# Patient Record
Sex: FEMALE | Race: OTHER | NOT HISPANIC OR LATINO | URBAN - METROPOLITAN AREA
[De-identification: names, ages, dates, MRNs, and addresses within clinical notes are randomized per-mention and may not be internally consistent; named-entity substitution may affect disease eponyms.]

---

## 2022-02-12 ENCOUNTER — EMERGENCY (EMERGENCY)
Facility: HOSPITAL | Age: 22
LOS: 1 days | Discharge: HOME | End: 2022-02-12
Attending: EMERGENCY MEDICINE | Admitting: EMERGENCY MEDICINE
Payer: COMMERCIAL

## 2022-02-12 VITALS
OXYGEN SATURATION: 98 % | RESPIRATION RATE: 16 BRPM | WEIGHT: 117.95 LBS | SYSTOLIC BLOOD PRESSURE: 112 MMHG | TEMPERATURE: 97 F | DIASTOLIC BLOOD PRESSURE: 79 MMHG | HEIGHT: 67 IN | HEART RATE: 81 BPM

## 2022-02-12 PROCEDURE — 70450 CT HEAD/BRAIN W/O DYE: CPT | Mod: MA

## 2022-02-12 PROCEDURE — 70450 CT HEAD/BRAIN W/O DYE: CPT | Mod: 26,MA

## 2022-02-12 PROCEDURE — 99284 EMERGENCY DEPT VISIT MOD MDM: CPT | Mod: 25

## 2022-02-12 PROCEDURE — 99284 EMERGENCY DEPT VISIT MOD MDM: CPT

## 2022-02-12 RX ORDER — ACETAMINOPHEN 500 MG
650 TABLET ORAL ONCE
Refills: 0 | Status: COMPLETED | OUTPATIENT
Start: 2022-02-12 | End: 2022-02-12

## 2022-02-12 RX ADMIN — Medication 650 MILLIGRAM(S): at 12:34
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
## 2022-02-12 NOTE — ED PROVIDER NOTE - ATTENDING CONTRIBUTION TO CARE
Dr. Taylor: I performed a face to face bedside interview with patient regarding history of present illness, review of symptoms and past medical history. I completed an independent physical exam.  I have discussed patient's plan of care with PA.   I agree with note as stated above, having amended the EMR as needed to reflect my findings.   This includes HISTORY OF PRESENT ILLNESS, HIV, PAST MEDICAL/SURGICAL/FAMILY/SOCIAL HISTORY, ALLERGIES AND HOME MEDICATIONS, REVIEW OF SYSTEMS, PHYSICAL EXAM, and any PROGRESS NOTES during the time I functioned as the attending physician for this patient.    see mdm
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
## 2022-02-12 NOTE — ED PROVIDER NOTE - PATIENT PORTAL LINK FT
You can access the FollowMyHealth Patient Portal offered by [facility] by registering at the following website: [email or web address]. By joining [employer]’s FollowMyHealth portal, you will also be able to view your health information using other applications (apps) compatible with our system.
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
## 2022-06-01 NOTE — ED ADULT NURSE NOTE - DATE OF FIRST COVID-19 BOOSTER
15-Tim-2022 Cheek-To-Nose Interpolation Flap Text: A decision was made to reconstruct the defect utilizing an interpolation axial flap and a staged reconstruction.  A telfa template was made of the defect.  This telfa template was then used to outline the Cheek-To-Nose Interpolation flap.  The donor area for the pedicle flap was then injected with anesthesia.  The flap was excised through the skin and subcutaneous tissue down to the layer of the underlying musculature.  The interpolation flap was carefully excised within this deep plane to maintain its blood supply.  The edges of the donor site were undermined.   The donor site was closed in a primary fashion.  The pedicle was then rotated into position and sutured.  Once the tube was sutured into place, adequate blood supply was confirmed with blanching and refill.  The pedicle was then wrapped with xeroform gauze and dressed appropriately with a telfa and gauze bandage to ensure continued blood supply and protect the attached pedicle.

## 2022-12-22 NOTE — ED PROVIDER NOTE - CROS ED CONS ALL NEG
From: Tobias Cohu  To: Remy Lemos negative...

## 2023-08-31 NOTE — ED PROVIDER NOTE - CPE EDP ENMT NORM
----- Message from Keely Nicholas sent at 8/31/2023  3:12 PM CDT -----  Contact: [phone]  Requesting an RX refill or new RX.  Is this a refill or new RX:   RX name and strength ()losartan (COZAAR) 50 MG tablet:    Is this a 30 day or 90 day RX:   Pharmacy name and phone # (  [id number] - [place], [place] - [address]  [facility] [address]  [facility] [address]  Phone: [phone] Fax: [phone]       Patient state He is out of his Rx and the pharmacy haven't received it.      normal...

## 2024-04-09 ENCOUNTER — EMERGENCY (EMERGENCY)
Facility: HOSPITAL | Age: 24
LOS: 1 days | Discharge: HOME | End: 2024-04-09
Attending: EMERGENCY MEDICINE | Admitting: EMERGENCY MEDICINE
Payer: COMMERCIAL

## 2024-04-09 VITALS
SYSTOLIC BLOOD PRESSURE: 123 MMHG | HEART RATE: 108 BPM | OXYGEN SATURATION: 98 % | TEMPERATURE: 98 F | WEIGHT: 123.02 LBS | RESPIRATION RATE: 19 BRPM | HEIGHT: 68 IN | DIASTOLIC BLOOD PRESSURE: 81 MMHG

## 2024-04-09 PROBLEM — Z78.9 OTHER SPECIFIED HEALTH STATUS: Chronic | Status: ACTIVE | Noted: 2022-02-12

## 2024-04-09 PROCEDURE — 99284 EMERGENCY DEPT VISIT MOD MDM: CPT

## 2024-04-09 PROCEDURE — 73562 X-RAY EXAM OF KNEE 3: CPT | Mod: 26,LT

## 2024-04-09 PROCEDURE — 81025 URINE PREGNANCY TEST: CPT

## 2024-04-09 PROCEDURE — 73562 X-RAY EXAM OF KNEE 3: CPT

## 2024-04-09 PROCEDURE — 73562 X-RAY EXAM OF KNEE 3: CPT | Mod: 26,RT,77

## 2024-04-09 RX ORDER — IBUPROFEN 200 MG
600 TABLET ORAL ONCE
Refills: 0 | Status: COMPLETED | OUTPATIENT
Start: 2024-04-09 | End: 2024-04-09

## 2024-04-09 RX ADMIN — Medication 600 MILLIGRAM(S): at 15:07

## 2024-04-09 NOTE — ED ADULT TRIAGE NOTE - MEANS OF ARRIVAL
Reviewed discharge instructions with patient including prescription for Percocet. WIfe and friend here to take patient home. Pt wheeled to front door and transferred into back seat without difficulty. ambulatory

## 2024-04-09 NOTE — ED PROVIDER NOTE - PATIENT PORTAL LINK FT
You can access the FollowMyHealth Patient Portal offered by [facility] by registering at the following website: [email or web address]. By joining [employer]’s FollowMyHealth portal, you will also be able to view your health information using other applications (apps) compatible with our system.

## 2024-04-09 NOTE — ED ADULT NURSE NOTE - NSFALLUNIVINTERV_ED_ALL_ED
Bed/Stretcher in lowest position, wheels locked, appropriate side rails in place/Call bell, personal items and telephone in reach/Instruct patient to call for assistance before getting out of bed/chair/stretcher/Non-slip footwear applied when patient is off stretcher/[place] to call system/Physically safe environment - no spills, clutter or unnecessary equipment/Purposeful proactive rounding/Room/bathroom lighting operational, light cord in reach

## 2024-04-09 NOTE — ED ADULT TRIAGE NOTE - NSWEIGHTCALCTOOLDRUG_GEN_A_CORE
What Is The Reason For Today's Visit?: Full Body Skin Examination What Is The Reason For Today's Visit? (Being Monitored For X): concerning skin lesions on a periodic basis  used

## 2024-04-09 NOTE — ED PROVIDER NOTE - PHYSICAL EXAMINATION
General:     NAD, well-nourished, well-appearing  Head:     NC/AT, EOMI  Ext:   no deformities, b/l knees stable. Ambulatory without limp. Distal neurovasc intact.   Skin: no rash, old bruises to the b/l shins.  Neuro: AAOx3, no sensory/motor deficits

## 2024-05-31 ENCOUNTER — EMERGENCY (EMERGENCY)
Facility: HOSPITAL | Age: 24
LOS: 1 days | Discharge: HOME | End: 2024-05-31
Attending: EMERGENCY MEDICINE | Admitting: EMERGENCY MEDICINE
Payer: COMMERCIAL

## 2024-05-31 VITALS
HEIGHT: 68 IN | HEART RATE: 113 BPM | DIASTOLIC BLOOD PRESSURE: 96 MMHG | OXYGEN SATURATION: 97 % | TEMPERATURE: 98 F | RESPIRATION RATE: 18 BRPM | WEIGHT: 123.02 LBS | SYSTOLIC BLOOD PRESSURE: 154 MMHG

## 2024-05-31 VITALS
OXYGEN SATURATION: 98 % | HEART RATE: 88 BPM | RESPIRATION RATE: 18 BRPM | DIASTOLIC BLOOD PRESSURE: 83 MMHG | SYSTOLIC BLOOD PRESSURE: 125 MMHG

## 2024-05-31 PROCEDURE — 99282 EMERGENCY DEPT VISIT SF MDM: CPT

## 2024-05-31 PROCEDURE — 99283 EMERGENCY DEPT VISIT LOW MDM: CPT

## 2024-05-31 RX ORDER — DIPHENHYDRAMINE HCL 50 MG
25 CAPSULE ORAL ONCE
Refills: 0 | Status: COMPLETED | OUTPATIENT
Start: 2024-05-31 | End: 2024-05-31

## 2024-05-31 RX ADMIN — Medication 25 MILLIGRAM(S): at 19:53

## 2024-05-31 NOTE — ED PROVIDER NOTE - PATIENT PORTAL LINK FT
You can access the FollowMyHealth Patient Portal offered by [facility] by registering at the following website: [email or web address]. By joining [employer]’s FollowMyHealth portal, you will also be able to view your health information using other applications (apps) compatible with our system.

## 2024-05-31 NOTE — ED ADULT TRIAGE NOTE - HEIGHT IN CM
Medical Necessity Clause: This procedure was medically necessary because the lesions that were treated were: Spray Paint Technique: No Show Applicator Variable?: Yes Medical Necessity Information: It is in your best interest to select a reason for this procedure from the list below. All of these items fulfill various CMS LCD requirements except the new and changing color options. Duration Of Freeze Thaw-Cycle (Seconds): 5 Post-Care Instructions: I reviewed with the patient in detail post-care instructions. Patient is to wear sunprotection, and avoid picking at any of the treated lesions. Pt may apply Vaseline to crusted or scabbing areas. 172.72 Detail Level: Simple Number Of Freeze-Thaw Cycles: 1 freeze-thaw cycle Spray Paint Text: The liquid nitrogen was applied to the skin utilizing a spray paint frosting technique. Consent: The patient's consent was obtained including but not limited to risks of crusting, scabbing, blistering, scarring, darker or lighter pigmentary change, recurrence, incomplete removal and infection.

## 2024-05-31 NOTE — ED PROVIDER NOTE - PHYSICAL EXAMINATION
Vitals: I have reviewed the patients vital signs  General: nontoxic appearing  HEENT: Atraumatic, normocephalic, airway patent  Eyes: EOMI, tracking appropriately  Neck: no tracheal deviation  Chest/Lungs: no trauma, symmetric chest rise, speaking in complete sentences,  no resp distress  Heart: skin and extremities well perfused, regular rate and rhythm  Neuro: A+Ox3, appears non focal  MSK: no deformities  Skin: no cyanosis, no jaundice right buttocks with a confluent area of papules that are blanching.  There is no excoriations.  There is no surrounding erythema.  There is no crepitance of the skin.  Psych:  Normal mood and affect

## 2024-05-31 NOTE — ED PROVIDER NOTE - CLINICAL SUMMARY MEDICAL DECISION MAKING FREE TEXT BOX
23-year-old female no past medical history with a rash on her right buttocks.  Differential includes but is not limited to a contact dermatitis, bite of some kind, not consistent with that of a necrotizing fasciitis or abscess at this time.  Will treat with Benadryl to see if is any further improvement to the appearance of the skin.  Will reassess at that time.  At this point I think infectious is very unlikely

## 2024-05-31 NOTE — ED PROVIDER NOTE - OBJECTIVE STATEMENT
23-year-old female no significant past medical history presenting with 30 minutes of rash to her right buttocks.  States that she was sitting on the garage floor when she noticed an itchy rash form on her right buttocks.  She denies any allergies.  Has not had this happen before.  Boyfriend applied a cream and the itching did get better.  She did some googling and thought that she had scabies.  Does not recall being bit by anything.  There are no other symptoms at the current time

## 2024-05-31 NOTE — ED PROVIDER NOTE - PROGRESS NOTE DETAILS
Patient's decide to do not want to wait any longer for Benadryl as she wanted to leave.  There are no red flags on this rash at this time.  I gave her strict return instructions for what to look out for.  I will discharge her at this time.

## 2024-05-31 NOTE — ED ADULT NURSE NOTE - NSFALLUNIVINTERV_ED_ALL_ED
Bed/Stretcher in lowest position, wheels locked, appropriate side rails in place/Call bell, personal items and telephone in reach/Instruct patient to call for assistance before getting out of bed/chair/stretcher/Non-slip footwear applied when patient is off stretcher/[place] to call system/Physically safe environment - no spills, clutter or unnecessary equipment/Purposeful proactive rounding/Room/bathroom lighting operational, light cord in reach

## 2024-05-31 NOTE — ED ADULT NURSE NOTE - OBJECTIVE STATEMENT
Pt is alert, came to the ER due to rash on the right buttocks as of today. No fever oe pain. No medical problems.
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
## 2024-11-15 ENCOUNTER — EMERGENCY (EMERGENCY)
Facility: HOSPITAL | Age: 24
LOS: 1 days | Discharge: HOME | End: 2024-11-15
Attending: EMERGENCY MEDICINE | Admitting: INTERNAL MEDICINE
Payer: COMMERCIAL

## 2024-11-15 VITALS
SYSTOLIC BLOOD PRESSURE: 151 MMHG | WEIGHT: 123.02 LBS | HEIGHT: 68 IN | TEMPERATURE: 98 F | OXYGEN SATURATION: 98 % | HEART RATE: 84 BPM | RESPIRATION RATE: 18 BRPM | DIASTOLIC BLOOD PRESSURE: 94 MMHG

## 2024-11-15 PROCEDURE — 99284 EMERGENCY DEPT VISIT MOD MDM: CPT

## 2024-11-15 PROCEDURE — 99283 EMERGENCY DEPT VISIT LOW MDM: CPT

## 2024-11-15 PROCEDURE — 73620 X-RAY EXAM OF FOOT: CPT

## 2024-11-15 PROCEDURE — 73620 X-RAY EXAM OF FOOT: CPT | Mod: 26,RT
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
## 2024-11-15 NOTE — ED PROVIDER NOTE - NSFOLLOWUPCLINICS_GEN_ALL_ED_FT
[facility]  Orthopedic Surgery  [address] [place] [address]  Phone: [phone]  Fax:   Follow Up Time: 1-3 Days    [facility]  Orthopedic Surgery  [address]  [place] [address]  Phone: [phone]  Fax:     [facility] Orthopedic [facility]  Orthopedic Surgery  [address], 3rd & 4th [place] [address]  Phone: [phone]  Fax:     [facility] [place]  Orthopedics  .  [place]   Phone: [phone]  Fax:     [facility]  Orthopedic Surgery  [address]  [place] [address]  Phone: [phone]  Fax:     [facility]  Orthopedic Surgery  [address] [place] [address]  Phone: [phone]  Fax:     [facility]  Orthopedics  [address] [place] [address]  Phone: [phone]  Fax: [phone]    [facility]  Orthopedics  [address] [place] [address]  Phone: [phone]  Fax:     [facility]  Orthopedic Surgery  [address] [place] [address]  Phone: [phone]  Fax:

## 2024-11-15 NOTE — ED PROVIDER NOTE - PATIENT PORTAL LINK FT
You can access the FollowMyHealth Patient Portal offered by [facility] by registering at the following website: [email or web address]. By joining [employer]’s FollowMyHealth portal, you will also be able to view your health information using other applications (apps) compatible with our system.
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
## 2024-11-15 NOTE — ED PROVIDER NOTE - NSFOLLOWUPCLINICSTOKEN_GEN_ALL_ED_FT
398220:1-3 Days|| ||00\01||False;825213: || ||00\01||False;576517: || ||00\01||False;753256: || ||00\01||False;038860: || ||00\01||False;563689: || ||00\01||False;584868: || ||00\01||False;366987: || ||00\01||False;792172: || ||00\01||False;
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
## 2024-11-15 NOTE — ED ADULT NURSE NOTE - FINAL NURSING ELECTRONIC SIGNATURE
Quality 226: Preventive Care And Screening: Tobacco Use: Screening And Cessation Intervention: Patient screened for tobacco use and is an ex/non-smoker Quality 130: Documentation Of Current Medications In The Medical Record: Current Medications Documented Detail Level: Detailed Quality 110: Preventive Care And Screening: Influenza Immunization: Influenza Immunization not Administered because Patient Refused. 15-Nov-2024 16:07

## 2024-11-15 NOTE — ED ADULT TRIAGE NOTE - PAIN: PRESENCE, MLM
Ambulatory Care Coordination Note  5/5/2021  CM Risk Score: 8  Charlson 10 Year Mortality Risk Score: 4%     ACC: Marques Dickson RN    Summary Note: Second attempt to reach patients sister- Yong Uribe to discuss Care Coordination and my role, I left a message with the nature of my call and requested call back.
right foot/complains of pain/discomfort

## 2025-01-01 ENCOUNTER — EMERGENCY (EMERGENCY)
Facility: HOSPITAL | Age: 25
LOS: 1 days | Discharge: HOME | End: 2025-01-01
Attending: EMERGENCY MEDICINE | Admitting: EMERGENCY MEDICINE
Payer: COMMERCIAL

## 2025-01-01 VITALS
SYSTOLIC BLOOD PRESSURE: 113 MMHG | DIASTOLIC BLOOD PRESSURE: 81 MMHG | TEMPERATURE: 98 F | RESPIRATION RATE: 16 BRPM | OXYGEN SATURATION: 100 % | HEART RATE: 92 BPM

## 2025-01-01 VITALS
SYSTOLIC BLOOD PRESSURE: 117 MMHG | WEIGHT: 119.93 LBS | HEART RATE: 111 BPM | RESPIRATION RATE: 18 BRPM | TEMPERATURE: 97 F | DIASTOLIC BLOOD PRESSURE: 78 MMHG | HEIGHT: 68 IN | OXYGEN SATURATION: 99 %

## 2025-01-01 PROCEDURE — 73140 X-RAY EXAM OF FINGER(S): CPT

## 2025-01-01 PROCEDURE — 81025 URINE PREGNANCY TEST: CPT

## 2025-01-01 PROCEDURE — 99283 EMERGENCY DEPT VISIT LOW MDM: CPT

## 2025-01-01 PROCEDURE — 73140 X-RAY EXAM OF FINGER(S): CPT | Mod: 26,LT

## 2025-01-01 PROCEDURE — 26750 TREAT FINGER FRACTURE EACH: CPT | Mod: 54,LT

## 2025-01-01 PROCEDURE — 99284 EMERGENCY DEPT VISIT MOD MDM: CPT | Mod: 57

## 2025-01-01 RX ORDER — ACETAMINOPHEN 500MG 500 MG/1
975 TABLET, COATED ORAL ONCE
Refills: 0 | Status: COMPLETED | OUTPATIENT
Start: 2025-01-01 | End: 2025-01-01

## 2025-01-01 RX ADMIN — ACETAMINOPHEN 500MG 975 MILLIGRAM(S): 500 TABLET, COATED ORAL at 03:11

## 2025-01-01 NOTE — ED PROVIDER NOTE - PHYSICAL EXAMINATION
exam:   General: well appearing, NAD. mildly intoxicated but coherent  HEENT: eyes perrl, nose normal, head without swelling/tenderness/ discoloration or other signs of trauma  cor: RRR, s1s2, 2+rad pulses.   lungs: ctabl, no resp distress.   neuro: a&ox3, cn2-12 intact, MAYER, 5/5 strength c nl sensation all extremities, nl coordination.   MSK: no c/t/L spine tenderness. FROM hips without pain. Right fourth and fifth fingers with mild superficial abrasions to the dorsal side of mid phalanx.  Fully extends MCP PIP DIP.  Flexes PIP DIP fourth and fifth fingers to about 90 degrees, limited by pain.  Mild tenderness of the mid and distal phalanx of his fourth and fifth fingers.  No pallor or cyanosis of fingers
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
## 2025-01-01 NOTE — ED PROVIDER NOTE - CARE PROVIDERS DIRECT ADDRESSES
[facility]                      [facility], [address]                                PULMONARY FUNCTION    PATIENT NAME: Nishi Bhatt                  :        1928  MED REC NO:   [id number]                          ROOM:  ACCOUNT NO:   [de-identified]                          ADMIT DATE: 2017  PROVIDER:     Rekha Castellano MD    DATE OF PROCEDURE:  2017    PULMONARY FUNCTION STUDY    Spirometry reveals normal FVC and FEV1.  FEV1/FVC ratio is mildly reduced  at 68%. Lung volumes reveal normal total lung capacity, vital capacity,  and residual volume. Diffusion capacity is also mildly reduced. IMPRESSION:  Mild obstructive defect.         Mikel Mathew MD    D: 2017 8:41:14       T: 2017 9:37:47     [email or web address]  Job#: [id number]     Doc#: [id number]    CC:
[email or web address]

## 2025-01-01 NOTE — ED PROCEDURE NOTE - NS ED PROCEDURE ASSISTED BY
I reviewed the H&P, I examined the patient, and there are no changes in the patient's condition.   The procedure was performed independently

## 2025-01-01 NOTE — ED PROVIDER NOTE - CARE PROVIDER_API CALL
Gabriela Greene  Plastic Surgery  [address] [place] [address], [address]  [place] [address]  Phone: [phone]  Fax: [phone]  Follow Up Time: 4-6 Days
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
## 2025-01-01 NOTE — ED PROVIDER NOTE - PATIENT PORTAL LINK FT
You can access the FollowMyHealth Patient Portal offered by [facility] by registering at the following website: [email or web address]. By joining [employer]’s FollowMyHealth portal, you will also be able to view your health information using other applications (apps) compatible with our system.

## 2025-01-01 NOTE — ED PROVIDER NOTE - NSFOLLOWUPINSTRUCTIONS_ED_ALL_ED_FT
Follow-up with hand surgery Dr. Greene within a week regarding broken pinky.   -Keep left pinky in splint  -Take Motrin (ibuprofen) 400 to 600 mg every 6 hours as needed for pain.  -In addition to Motrin, you can also take Tylenol 1000 mg every 6 hours as needed for pain.    Finger Fracture    WHAT YOU NEED TO KNOW:    A finger fracture is a break in one or more of the bones in your finger.    DISCHARGE INSTRUCTIONS:    Return to the emergency department if:    Your cast or splint gets wet, damaged, or comes off.    Your splint or cast feels too tight.    You have severe pain.    Your injured finger is numb, cold, or pale.  Call your doctor or hand specialist if:    Your pain or swelling gets worse, even after treatment.    You have questions or concerns about your condition or care.  Medicines: You may need any of the following:    NSAIDs, such as ibuprofen, help decrease swelling, pain, and fever. This medicine is available with or without a doctor's order. NSAIDs can cause stomach bleeding or kidney problems in certain people. If you take blood thinner medicine, always ask your healthcare provider if NSAIDs are safe for you. Always read the medicine label and follow directions.    Acetaminophen decreases pain and fever. It is available without a doctor's order. Ask how much to take and how often to take it. Follow directions. Read the labels of all other medicines you are using to see if they also contain acetaminophen, or ask your doctor or pharmacist. Acetaminophen can cause liver damage if not taken correctly.    Prescription pain medicine may be given. Ask your healthcare provider how to take this medicine safely. Some prescription pain medicines contain acetaminophen. Do not take other medicines that contain acetaminophen without talking to your healthcare provider. Too much acetaminophen may cause liver damage. Prescription pain medicine may cause constipation. Ask your healthcare provider how to prevent or treat constipation.    Take your medicine as directed. Contact your healthcare provider if you think your medicine is not helping or if you have side effects. Tell your provider if you are allergic to any medicine. Keep a list of the medicines, vitamins, and herbs you take. Include the amounts, and when and why you take them. Bring the list or the pill bottles to follow-up visits. Carry your medicine list with you in case of an emergency.  Self-care:    Wear your splint as directed. Do not remove your splint until you follow up with your healthcare provider or hand specialist.    Apply ice on your finger for 15 to 20 minutes every hour or as directed. Use an ice pack, or put crushed ice in a plastic bag. Cover it with a towel before you apply it to your skin. Ice helps prevent tissue damage and decreases swelling and pain.    Elevate your finger above the level of your heart as often as you can. This will help decrease swelling and pain. Prop your hand on pillows or blankets to keep it elevated comfortably.        Go to physical therapy as directed. A physical therapist teaches you exercises to help improve movement and strength, and to decrease pain.  Follow up with your doctor or hand specialist within 2 days: Write down your questions so you remember to ask them during your visits.

## 2025-02-10 ENCOUNTER — EMERGENCY (EMERGENCY)
Facility: HOSPITAL | Age: 25
LOS: 1 days | Discharge: HOME | End: 2025-02-10
Attending: EMERGENCY MEDICINE | Admitting: EMERGENCY MEDICINE
Payer: COMMERCIAL

## 2025-02-10 VITALS
WEIGHT: 119.93 LBS | HEIGHT: 68 IN | HEART RATE: 85 BPM | RESPIRATION RATE: 17 BRPM | SYSTOLIC BLOOD PRESSURE: 130 MMHG | TEMPERATURE: 97 F | DIASTOLIC BLOOD PRESSURE: 89 MMHG | OXYGEN SATURATION: 99 %

## 2025-02-10 PROCEDURE — 73140 X-RAY EXAM OF FINGER(S): CPT | Mod: 26,LT

## 2025-02-10 PROCEDURE — 96372 THER/PROPH/DIAG INJ SC/IM: CPT

## 2025-02-10 PROCEDURE — 99285 EMERGENCY DEPT VISIT HI MDM: CPT

## 2025-02-10 PROCEDURE — 73140 X-RAY EXAM OF FINGER(S): CPT

## 2025-02-10 PROCEDURE — 99283 EMERGENCY DEPT VISIT LOW MDM: CPT | Mod: 25

## 2025-02-10 RX ORDER — ACETAMINOPHEN 160 MG/5ML
975 SUSPENSION ORAL ONCE
Refills: 0 | Status: COMPLETED | OUTPATIENT
Start: 2025-02-10 | End: 2025-02-10

## 2025-02-10 RX ORDER — KETOROLAC TROMETHAMINE 10 MG
30 TABLET ORAL ONCE
Refills: 0 | Status: DISCONTINUED | OUTPATIENT
Start: 2025-02-10 | End: 2025-02-10

## 2025-02-10 RX ADMIN — Medication 30 MILLIGRAM(S): at 12:57

## 2025-02-10 RX ADMIN — ACETAMINOPHEN 975 MILLIGRAM(S): 160 SUSPENSION ORAL at 12:56

## 2025-02-10 NOTE — ED PROVIDER NOTE - NSFOLLOWUPINSTRUCTIONS_ED_ALL_ED_FT
As discussed, please follow-up with hand surgery this week.  Phone number and address as below.  Keep the splint on until you see them.  For any pain, can take Tylenol and Motrin, follow directions on the bottle.    Gabriela Greene  Hand Surgery  [address], [address]  [place] [address]  Phone: [phone]  Fax: [phone]    Mallet Finger: Care Instructions  Skip Navigation  Overview  Mallet finger is a bent fingertip. It is caused by a fractured bone or torn tendon at the base of the finger joint near the fingertip. The injury can happen when a finger is bent with force, such as when trying to catch a ball and the fingertip is struck by the ball. It also is called baseball finger or drop finger.    Treatment includes wearing a splint for several weeks to keep the finger straight. Surgery may be needed if pieces of bone break off during the injury or if treatment with the splint does not work. Usually only a splint is needed.    It is very important that you wear and take care of the splint exactly as your doctor tells you to so that your finger heals properly and is no longer bent. Wearing a splint may interfere with your normal activities. Ask for help with daily tasks if you need it.    Follow-up care is a key part of your treatment and safety. Be sure to make and go to all appointments, and call your doctor if you are having problems. It's also a good idea to know your test results and keep a list of the medicines you take.    How can you care for yourself at home?  If your doctor put a splint on your finger, wear the splint exactly as directed. Do not remove it until your doctor says that you can.  Keep your hand raised above the level of your heart as much as you can. This will help reduce swelling.  Put ice or a cold pack on your finger for 10 to 20 minutes at a time. Try to do this every 1 to 2 hours for the next 3 days (when you are awake) or until the swelling goes down. Put a thin cloth between the ice and your skin. Keep the splint dry.  Take pain medicines exactly as directed.  If the doctor gave you a prescription medicine for pain, take it as prescribed.  If you are not taking a prescription pain medicine, ask your doctor if you can take an over-the-counter medicine.  When should you call for help?  	  Call your doctor now or seek immediate medical care if:    Your finger is cool or pale or changes color.  Your pain gets much worse.  You have tingling or numbness in your finger.  You have signs of infection. These include:  Increased pain, swelling, warmth, or redness.  Red streaks leading from the area.  Pus draining from the area.  Swollen lymph nodes in the armpits.  A fever.  Watch closely for changes in your health, and be sure to contact your doctor if:    You have more pain and swelling than your doctor told you to expect.

## 2025-02-10 NOTE — ED ADULT TRIAGE NOTE - CHIEF COMPLAINT QUOTE
Patient presents to ED with complaint of left habnd 5th digit pain. States "she broke her finger in december and that the finger has not healed properly yet." Alert and oriented x 4.

## 2025-02-10 NOTE — ED PROVIDER NOTE - PHYSICAL EXAMINATION
Vital Signs Last 24 Hrs  T(C): 36.3 (10 Feb 2025 12:00), Max: 36.3 (10 Feb 2025 12:00)  T(F): 97.3 (10 Feb 2025 12:00), Max: 97.3 (10 Feb 2025 12:00)  HR: 85 (10 Feb 2025 12:00) (85 - 85)  BP: 130/89 (10 Feb 2025 12:00) (130/89 - 130/89)  BP(mean): --  RR: 17 (10 Feb 2025 12:00) (17 - 17)  SpO2: 99% (10 Feb 2025 12:00) (99% - 99%)    Parameters below as of 10 Feb 2025 12:00  Patient On (Oxygen Delivery Method): room air    Vital Signs: I have reviewed the initial vital signs.  Constitutional: well-nourished, appears stated age, no acute distress.  MSK: left 5th digit in flexion. Only able to passively extend. Neck supple, nontender, nl ROM, no stepoff. Chest nontender. Back nontender in TLS spine or to b/l bony structures. Ext nontender, nl rom, no deformity.

## 2025-02-10 NOTE — ED PROVIDER NOTE - NS ED ATTENDING STATEMENT MOD
I have seen and examined this patient and fully participated in the care of this patient as the teaching attending.  The service was shared with the OMAR.  I reviewed and verified the documentation.

## 2025-02-10 NOTE — ED PROVIDER NOTE - ATTENDING CONTRIBUTION TO CARE
Agree with PA student note and work up with following exceptions:    Spoke with hand surgery/Dr. Greene wound: Splint in extension, hand follow-up, nothing to do at this time. Agree with PA student note and work up with following exceptions:  NVI on exam. no signs/sx's of flexor tenosynovitis. concern for mallet finger with DIP stuck in flexion.    Spoke with hand surgery/Dr. Greene wound: Splint in extension, hand follow-up, nothing to do at this time.  pt placed in another finger aluminum splint, outpatient ortho appt tomorrow. will dc

## 2025-02-10 NOTE — ED PROVIDER NOTE - CARE PROVIDER_API CALL
Gabriela Greene  Plastic Surgery  [address] [place] [address], [address]  [place] [address]  Phone: [phone]  Fax: [phone]  Follow Up Time:

## 2025-02-10 NOTE — ED PROVIDER NOTE - OBJECTIVE STATEMENT
23 yo F with no significant PMH presents with finger deformity and inability to extend the left 5th finger. Pt was seen in the ED on 1/1 for a nondisplaced left distal phalanx fracture. Admits to not seeing hand specialist because she thought the finger would heal with the splint. She notes mild pain if she tries to extend the finger.   Denies fever, chills, numbness/tingling.

## 2025-02-10 NOTE — ED PROVIDER NOTE - PATIENT PORTAL LINK FT
You can access the FollowMyHealth Patient Portal offered by [facility] by registering at the following website: [email or web address]. By joining [employer]’s FollowMyHealth portal, you will also be able to view your health information using other applications (apps) compatible with our system.

## 2025-02-11 ENCOUNTER — NON-APPOINTMENT (OUTPATIENT)
Age: 25
End: 2025-02-11

## 2025-02-11 ENCOUNTER — APPOINTMENT (OUTPATIENT)
Dept: ORTHOPEDIC SURGERY | Facility: CLINIC | Age: 25
End: 2025-02-11
Payer: COMMERCIAL

## 2025-02-11 VITALS — BODY MASS INDEX: 18.19 KG/M2 | HEIGHT: 68 IN | WEIGHT: 120 LBS

## 2025-02-11 DIAGNOSIS — S62.639A DISPLACED FRACTURE OF DISTAL PHALANX OF UNSPECIFIED FINGER, INITIAL ENCOUNTER FOR CLOSED FRACTURE: ICD-10-CM

## 2025-02-11 DIAGNOSIS — M20.019 DISPLACED FRACTURE OF DISTAL PHALANX OF UNSPECIFIED FINGER, INITIAL ENCOUNTER FOR CLOSED FRACTURE: ICD-10-CM

## 2025-02-11 PROBLEM — Z00.00 ENCOUNTER FOR PREVENTIVE HEALTH EXAMINATION: Status: ACTIVE | Noted: 2025-02-11

## 2025-02-11 PROCEDURE — 99203 OFFICE O/P NEW LOW 30 MIN: CPT

## 2025-02-11 PROCEDURE — 73140 X-RAY EXAM OF FINGER(S): CPT | Mod: LT

## 2025-02-12 ENCOUNTER — TRANSCRIPTION ENCOUNTER (OUTPATIENT)
Age: 25
End: 2025-02-12

## 2025-02-20 ENCOUNTER — EMERGENCY (EMERGENCY)
Facility: HOSPITAL | Age: 25
LOS: 1 days | Discharge: HOME | End: 2025-02-20
Attending: STUDENT IN AN ORGANIZED HEALTH CARE EDUCATION/TRAINING PROGRAM | Admitting: EMERGENCY MEDICINE
Payer: COMMERCIAL

## 2025-02-20 VITALS
HEART RATE: 85 BPM | WEIGHT: 115.96 LBS | TEMPERATURE: 98 F | RESPIRATION RATE: 18 BRPM | HEIGHT: 68 IN | SYSTOLIC BLOOD PRESSURE: 150 MMHG | OXYGEN SATURATION: 99 % | DIASTOLIC BLOOD PRESSURE: 93 MMHG

## 2025-02-20 VITALS
OXYGEN SATURATION: 99 % | HEART RATE: 78 BPM | DIASTOLIC BLOOD PRESSURE: 89 MMHG | SYSTOLIC BLOOD PRESSURE: 138 MMHG | RESPIRATION RATE: 16 BRPM

## 2025-02-20 PROCEDURE — 99283 EMERGENCY DEPT VISIT LOW MDM: CPT | Mod: 25

## 2025-02-20 PROCEDURE — 90715 TDAP VACCINE 7 YRS/> IM: CPT

## 2025-02-20 PROCEDURE — 90471 IMMUNIZATION ADMIN: CPT

## 2025-02-20 PROCEDURE — 99284 EMERGENCY DEPT VISIT MOD MDM: CPT

## 2025-02-20 RX ORDER — CLOSTRIDIUM TETANI TOXOID ANTIGEN (FORMALDEHYDE INACTIVATED), CORYNEBACTERIUM DIPHTHERIAE TOXOID ANTIGEN (FORMALDEHYDE INACTIVATED), BORDETELLA PERTUSSIS TOXOID ANTIGEN (GLUTARALDEHYDE INACTIVATED), BORDETELLA PERTUSSIS FILAMENTOUS HEMAGGLUTININ ANTIGEN (FORMALDEHYDE INACTIVATED), BORDETELLA PERTUSSIS PERTACTIN ANTIGEN, AND BORDETELLA PERTUSSIS FIMBRIAE 2/3 ANTIGEN 5; 2; 2.5; 5; 3; 5 [LF]/.5ML; [LF]/.5ML; UG/.5ML; UG/.5ML; UG/.5ML; UG/.5ML
0.5 INJECTION, SUSPENSION INTRAMUSCULAR ONCE
Refills: 0 | Status: COMPLETED | OUTPATIENT
Start: 2025-02-20 | End: 2025-02-20

## 2025-02-20 RX ADMIN — CLOSTRIDIUM TETANI TOXOID ANTIGEN (FORMALDEHYDE INACTIVATED), CORYNEBACTERIUM DIPHTHERIAE TOXOID ANTIGEN (FORMALDEHYDE INACTIVATED), BORDETELLA PERTUSSIS TOXOID ANTIGEN (GLUTARALDEHYDE INACTIVATED), BORDETELLA PERTUSSIS FILAMENTOUS HEMAGGLUTININ ANTIGEN (FORMALDEHYDE INACTIVATED), BORDETELLA PERTUSSIS PERTACTIN ANTIGEN, AND BORDETELLA PERTUSSIS FIMBRIAE 2/3 ANTIGEN 0.5 MILLILITER(S): 5; 2; 2.5; 5; 3; 5 INJECTION, SUSPENSION INTRAMUSCULAR at 15:50

## 2025-02-20 NOTE — ED ADULT NURSE NOTE - NSFALLUNIVINTERV_ED_ALL_ED
Bed/Stretcher in lowest position, wheels locked, appropriate side rails in place/Call bell, personal items and telephone in reach/Instruct patient to call for assistance before getting out of bed/chair/stretcher/Non-slip footwear applied when patient is off stretcher/[place] to call system/Physically safe environment - no spills, clutter or unnecessary equipment/Purposeful proactive rounding/Room/bathroom lighting operational, light cord in reach

## 2025-02-20 NOTE — ED ADULT TRIAGE NOTE - CHIEF COMPLAINT QUOTE
Pt requesting tetanus shot and wound check to right thumb. Pt states x 12 days ago she cut it on the edge of her brandon ski.

## 2025-02-20 NOTE — ED PROVIDER NOTE - CLINICAL SUMMARY MEDICAL DECISION MAKING FREE TEXT BOX
Dr. Kemal Lopez MD, EM And Medical Toxicology Attendinf no pmhx presenting with right thumb wound s/p from edge of ski occurring about 12 days ago. Requesting tetanus shot and evaluation. Mild scabbing to the right dorsum of thumb, healed, with NO purulent drainage, redness or pain/swelling. No red streaking, fevers, chills, vomiting, nausea, other extremity injuries, numbness/tingling, weakness, chest pain, shortness of breath, abdominal pain.  History obtained from independent historian: N/A  External note reviewed: N/A  DDx includes but is not limited to: wound, old uncomplicated  Decision making, ED course, independent interpretation of imaging studies, and consults:   - adacel and bacitracin daily BID with clean dry dressing until healed. No signs of cellulitis or fever or chills to warrant systemic abx. FROM of the right thumb joint without pain.    Consideration hospitalization vs de-escalation of care: dc  Disposition: DC

## 2025-02-20 NOTE — ED ADULT NURSE NOTE - OBJECTIVE STATEMENT
Patient received A&Ox4, ambulatory with steady gait at the present. Patient complains of new onset of cut to thumb x12 days prior to arrival. States she cut it on brandon ski. Side rails up, call light in reach, safety maintained, comfort measures provided and MD evaluation in progress. Patient received A&Ox4, ambulatory with steady gait at the present. Patient complains of new onset of cut to thumb x12 days prior to arrival. States she cut it on brandon ski. Patient is without fever, swelling, redness or drainage. Side rails up, call light in reach, safety maintained, comfort measures provided and MD evaluation in progress.

## 2025-02-20 NOTE — ED ADULT NURSE NOTE - NSFALLLASTSIX_ED_ALL_ED
"Chief Complaint   Patient presents with     Hypertension     bp follow up        Initial BP (!) 160/98 (BP Location: Right arm, Patient Position: Chair, Cuff Size: Adult Large)  Pulse 102  Temp 97.9  F (36.6  C) (Oral)  Resp 16  Ht 5' 4\" (1.626 m)  Wt 264 lb 6.4 oz (119.9 kg)  LMP 02/14/2017  SpO2 99%  BMI 45.38 kg/m2 Estimated body mass index is 45.38 kg/(m^2) as calculated from the following:    Height as of this encounter: 5' 4\" (1.626 m).    Weight as of this encounter: 264 lb 6.4 oz (119.9 kg).  Medication Reconciliation: complete   Sejal Medeiros MA      " No.

## 2025-02-20 NOTE — ED PROVIDER NOTE - PATIENT PORTAL LINK FT
You can access the FollowMyHealth Patient Portal offered by [facility] by registering at the following website: [email or web address]. By joining [employer]’s FollowMyHealth portal, you will also be able to view your health information using other applications (apps) compatible with our system.

## 2025-02-20 NOTE — ED PROVIDER NOTE - PHYSICAL EXAMINATION
VITAL SIGNS: I have reviewed nursing notes and confirm.   GEN: Well-developed; well-nourished; in no acute distress. Speaking full sentences.  SKIN: Warm, pink, no rash, no diaphoresis, no cyanosis, well perfused.   HEAD: Normocephalic; atraumatic.    MSK: Normal range of motion and movement of all 4 extremities. No apparent joint or muscular pain throughout.  (+) RIGHT dorsal thumb partially healed w/ scab 2cm oval shaped lesion, no bleeding, no redness or warmth or red streaking or purulent drainage, FROM of the thumb and joint w/o pain. cap refill < 2 sec  BACK: No thoracolumbar midline or paravertebral tenderness.    NEURO: Alert & oriented x 3, Grossly unremarkable. Sensory and motor intact throughout. No focal deficits.  Normal speech and coordination.

## 2025-02-20 NOTE — ED PROVIDER NOTE - NSFOLLOWUPINSTRUCTIONS_ED_ALL_ED_FT
Rest, drink plenty of fluids.  Advance activity as tolerated.  Continue all previously prescribed medications as directed.  Follow up with your PMD 2-3 days and bring copies of your results.  Return to the ER for worsening symptoms, fevers, vomiting, weakness, yellow drainage, red streaking, increased pain, increased swelling or new concerning symptoms.

## 2025-02-20 NOTE — ED ADULT TRIAGE NOTE - HEIGHT IN FEET
5 Protopic Counseling: Patient may experience a mild burning sensation during topical application. Protopic is not approved in children less than 2 years of age. There have been case reports of hematologic and skin malignancies in patients using topical calcineurin inhibitors although causality is questionable.

## 2025-02-20 NOTE — ED PROVIDER NOTE - OBJECTIVE STATEMENT
24f no pmhx presenting with right thumb wound s/p from edge of ski occurring about 12 days ago. Requesting tetanus shot and evaluation. Mild scabbing to the right dorsum of thumb, healed, with NO purulent drainage, redness or pain/swelling. No red streaking, fevers, chills, vomiting, nausea, other extremity injuries, numbness/tingling, weakness, chest pain, shortness of breath, abdominal pain.

## 2025-02-28 NOTE — ED ADULT TRIAGE NOTE - IDEAL BODY WEIGHT(KG)
----- Message from David Goddard MD sent at 2/28/2025 12:08 PM CST -----  MRI of the head shows no evidence of tumor or masses, some age-related changes in the brain is noted.  No significant sinus disease is noted either.  
Patient and daughter notified to stop taking Rizatriptan for now and to try Fioricet. Update the office next week to see if it is helping. Patient and daughter verbalized understanding.   
Spoke with patient approved representative daughter Victorino, Victorino expressed understanding however would like to know what further to do regarding Patient experiencing headaches.  Victorino stated that patient experiencing headaches on daily basis  
64